# Patient Record
Sex: FEMALE | Race: WHITE | NOT HISPANIC OR LATINO | Employment: OTHER | URBAN - NONMETROPOLITAN AREA
[De-identification: names, ages, dates, MRNs, and addresses within clinical notes are randomized per-mention and may not be internally consistent; named-entity substitution may affect disease eponyms.]

---

## 2020-07-20 ENCOUNTER — APPOINTMENT (EMERGENCY)
Dept: RADIOLOGY | Facility: HOSPITAL | Age: 65
End: 2020-07-20
Payer: COMMERCIAL

## 2020-07-20 ENCOUNTER — HOSPITAL ENCOUNTER (EMERGENCY)
Facility: HOSPITAL | Age: 65
Discharge: HOME | End: 2020-07-20
Attending: EMERGENCY MEDICINE | Admitting: EMERGENCY MEDICINE
Payer: COMMERCIAL

## 2020-07-20 ENCOUNTER — APPOINTMENT (EMERGENCY)
Dept: CT IMAGING | Facility: HOSPITAL | Age: 65
End: 2020-07-20
Payer: COMMERCIAL

## 2020-07-20 VITALS
OXYGEN SATURATION: 98 % | HEART RATE: 56 BPM | TEMPERATURE: 97.8 F | RESPIRATION RATE: 11 BRPM | SYSTOLIC BLOOD PRESSURE: 130 MMHG | WEIGHT: 225.31 LBS | DIASTOLIC BLOOD PRESSURE: 62 MMHG

## 2020-07-20 DIAGNOSIS — R10.9 LEFT FLANK PAIN: Primary | ICD-10-CM

## 2020-07-20 DIAGNOSIS — K57.90 DIVERTICULOSIS: ICD-10-CM

## 2020-07-20 DIAGNOSIS — N39.0 UTI (URINARY TRACT INFECTION): ICD-10-CM

## 2020-07-20 LAB
ALBUMIN SERPL BCP-MCNC: 3.4 G/DL (ref 3.5–5)
ALP SERPL-CCNC: 66 U/L (ref 46–116)
ALT SERPL W P-5'-P-CCNC: 24 U/L (ref 12–78)
ANION GAP SERPL CALCULATED.3IONS-SCNC: 7 MMOL/L (ref 4–13)
AST SERPL W P-5'-P-CCNC: 14 U/L (ref 5–45)
ATRIAL RATE: 64 BPM
BACTERIA UR QL AUTO: ABNORMAL /HPF
BASOPHILS # BLD AUTO: 0.06 THOUSANDS/ΜL (ref 0–0.1)
BASOPHILS NFR BLD AUTO: 1 % (ref 0–1)
BILIRUB SERPL-MCNC: 0.26 MG/DL (ref 0.2–1)
BILIRUB UR QL STRIP: NEGATIVE
BUN SERPL-MCNC: 14 MG/DL (ref 5–25)
CALCIUM SERPL-MCNC: 9.2 MG/DL (ref 8.3–10.1)
CHLORIDE SERPL-SCNC: 105 MMOL/L (ref 100–108)
CK SERPL-CCNC: 87 U/L (ref 26–192)
CLARITY UR: CLEAR
CO2 SERPL-SCNC: 28 MMOL/L (ref 21–32)
COLOR UR: ABNORMAL
CREAT SERPL-MCNC: 0.74 MG/DL (ref 0.6–1.3)
EOSINOPHIL # BLD AUTO: 0.22 THOUSAND/ΜL (ref 0–0.61)
EOSINOPHIL NFR BLD AUTO: 2 % (ref 0–6)
ERYTHROCYTE [DISTWIDTH] IN BLOOD BY AUTOMATED COUNT: 13.8 % (ref 11.6–15.1)
GFR SERPL CREATININE-BSD FRML MDRD: 86 ML/MIN/1.73SQ M
GLUCOSE SERPL-MCNC: 111 MG/DL (ref 65–140)
GLUCOSE UR STRIP-MCNC: NEGATIVE MG/DL
HCT VFR BLD AUTO: 35.6 % (ref 34.8–46.1)
HGB BLD-MCNC: 11.3 G/DL (ref 11.5–15.4)
HGB UR QL STRIP.AUTO: ABNORMAL
IMM GRANULOCYTES # BLD AUTO: 0.02 THOUSAND/UL (ref 0–0.2)
IMM GRANULOCYTES NFR BLD AUTO: 0 % (ref 0–2)
INR PPP: 1.01 (ref 0.84–1.19)
KETONES UR STRIP-MCNC: NEGATIVE MG/DL
LACTATE SERPL-SCNC: 0.6 MMOL/L (ref 0.5–2)
LEUKOCYTE ESTERASE UR QL STRIP: ABNORMAL
LIPASE SERPL-CCNC: 104 U/L (ref 73–393)
LYMPHOCYTES # BLD AUTO: 3.3 THOUSANDS/ΜL (ref 0.6–4.47)
LYMPHOCYTES NFR BLD AUTO: 36 % (ref 14–44)
MAGNESIUM SERPL-MCNC: 2 MG/DL (ref 1.6–2.6)
MCH RBC QN AUTO: 26.7 PG (ref 26.8–34.3)
MCHC RBC AUTO-ENTMCNC: 31.7 G/DL (ref 31.4–37.4)
MCV RBC AUTO: 84 FL (ref 82–98)
MONOCYTES # BLD AUTO: 0.63 THOUSAND/ΜL (ref 0.17–1.22)
MONOCYTES NFR BLD AUTO: 7 % (ref 4–12)
NEUTROPHILS # BLD AUTO: 4.83 THOUSANDS/ΜL (ref 1.85–7.62)
NEUTS SEG NFR BLD AUTO: 54 % (ref 43–75)
NITRITE UR QL STRIP: POSITIVE
NON-SQ EPI CELLS URNS QL MICRO: ABNORMAL /HPF
NRBC BLD AUTO-RTO: 0 /100 WBCS
NT-PROBNP SERPL-MCNC: 115 PG/ML
P AXIS: 47 DEGREES
PH UR STRIP.AUTO: 6 [PH]
PLATELET # BLD AUTO: 255 THOUSANDS/UL (ref 149–390)
PMV BLD AUTO: 10 FL (ref 8.9–12.7)
POTASSIUM SERPL-SCNC: 3.8 MMOL/L (ref 3.5–5.3)
PR INTERVAL: 184 MS
PROT SERPL-MCNC: 7 G/DL (ref 6.4–8.2)
PROT UR STRIP-MCNC: ABNORMAL MG/DL
PROTHROMBIN TIME: 13.3 SECONDS (ref 11.6–14.5)
QRS AXIS: 27 DEGREES
QRSD INTERVAL: 104 MS
QT INTERVAL: 430 MS
QTC INTERVAL: 443 MS
RBC # BLD AUTO: 4.23 MILLION/UL (ref 3.81–5.12)
RBC #/AREA URNS AUTO: ABNORMAL /HPF
SODIUM SERPL-SCNC: 140 MMOL/L (ref 136–145)
SP GR UR STRIP.AUTO: 1.02 (ref 1–1.03)
T WAVE AXIS: 40 DEGREES
TROPONIN I SERPL-MCNC: <0.02 NG/ML
UROBILINOGEN UR QL STRIP.AUTO: 1 E.U./DL
VENTRICULAR RATE: 64 BPM
WBC # BLD AUTO: 9.06 THOUSAND/UL (ref 4.31–10.16)
WBC #/AREA URNS AUTO: ABNORMAL /HPF

## 2020-07-20 PROCEDURE — 96361 HYDRATE IV INFUSION ADD-ON: CPT

## 2020-07-20 PROCEDURE — 96366 THER/PROPH/DIAG IV INF ADDON: CPT

## 2020-07-20 PROCEDURE — 84484 ASSAY OF TROPONIN QUANT: CPT | Performed by: EMERGENCY MEDICINE

## 2020-07-20 PROCEDURE — 87086 URINE CULTURE/COLONY COUNT: CPT | Performed by: EMERGENCY MEDICINE

## 2020-07-20 PROCEDURE — 82550 ASSAY OF CK (CPK): CPT | Performed by: EMERGENCY MEDICINE

## 2020-07-20 PROCEDURE — 85025 COMPLETE CBC W/AUTO DIFF WBC: CPT | Performed by: EMERGENCY MEDICINE

## 2020-07-20 PROCEDURE — 81001 URINALYSIS AUTO W/SCOPE: CPT | Performed by: EMERGENCY MEDICINE

## 2020-07-20 PROCEDURE — 93005 ELECTROCARDIOGRAM TRACING: CPT

## 2020-07-20 PROCEDURE — 96365 THER/PROPH/DIAG IV INF INIT: CPT

## 2020-07-20 PROCEDURE — 74177 CT ABD & PELVIS W/CONTRAST: CPT

## 2020-07-20 PROCEDURE — 83605 ASSAY OF LACTIC ACID: CPT | Performed by: EMERGENCY MEDICINE

## 2020-07-20 PROCEDURE — 87186 SC STD MICRODIL/AGAR DIL: CPT | Performed by: EMERGENCY MEDICINE

## 2020-07-20 PROCEDURE — 36415 COLL VENOUS BLD VENIPUNCTURE: CPT | Performed by: EMERGENCY MEDICINE

## 2020-07-20 PROCEDURE — 83735 ASSAY OF MAGNESIUM: CPT | Performed by: EMERGENCY MEDICINE

## 2020-07-20 PROCEDURE — 83690 ASSAY OF LIPASE: CPT | Performed by: EMERGENCY MEDICINE

## 2020-07-20 PROCEDURE — 85610 PROTHROMBIN TIME: CPT | Performed by: EMERGENCY MEDICINE

## 2020-07-20 PROCEDURE — 99285 EMERGENCY DEPT VISIT HI MDM: CPT | Performed by: EMERGENCY MEDICINE

## 2020-07-20 PROCEDURE — 80053 COMPREHEN METABOLIC PANEL: CPT | Performed by: EMERGENCY MEDICINE

## 2020-07-20 PROCEDURE — 71275 CT ANGIOGRAPHY CHEST: CPT

## 2020-07-20 PROCEDURE — 99285 EMERGENCY DEPT VISIT HI MDM: CPT

## 2020-07-20 PROCEDURE — 83880 ASSAY OF NATRIURETIC PEPTIDE: CPT | Performed by: EMERGENCY MEDICINE

## 2020-07-20 PROCEDURE — 96375 TX/PRO/DX INJ NEW DRUG ADDON: CPT

## 2020-07-20 PROCEDURE — 87077 CULTURE AEROBIC IDENTIFY: CPT | Performed by: EMERGENCY MEDICINE

## 2020-07-20 PROCEDURE — 96376 TX/PRO/DX INJ SAME DRUG ADON: CPT

## 2020-07-20 PROCEDURE — 93010 ELECTROCARDIOGRAM REPORT: CPT | Performed by: INTERNAL MEDICINE

## 2020-07-20 RX ORDER — ACETAMINOPHEN 325 MG/1
650 TABLET ORAL EVERY 6 HOURS PRN
Qty: 30 TABLET | Refills: 0 | Status: SHIPPED | OUTPATIENT
Start: 2020-07-20

## 2020-07-20 RX ORDER — PHENAZOPYRIDINE HYDROCHLORIDE 200 MG/1
200 TABLET, FILM COATED ORAL 3 TIMES DAILY
Qty: 6 TABLET | Refills: 0 | Status: SHIPPED | OUTPATIENT
Start: 2020-07-20 | End: 2021-01-04 | Stop reason: ALTCHOICE

## 2020-07-20 RX ORDER — NAPROXEN 375 MG/1
375 TABLET ORAL 2 TIMES DAILY WITH MEALS
Qty: 20 TABLET | Refills: 0 | Status: SHIPPED | OUTPATIENT
Start: 2020-07-20 | End: 2020-07-20 | Stop reason: SINTOL

## 2020-07-20 RX ORDER — HYDRALAZINE HYDROCHLORIDE 20 MG/ML
10 INJECTION INTRAMUSCULAR; INTRAVENOUS ONCE
Status: DISCONTINUED | OUTPATIENT
Start: 2020-07-20 | End: 2020-07-20 | Stop reason: HOSPADM

## 2020-07-20 RX ORDER — CEFTRIAXONE 1 G/50ML
1000 INJECTION, SOLUTION INTRAVENOUS ONCE
Status: COMPLETED | OUTPATIENT
Start: 2020-07-20 | End: 2020-07-20

## 2020-07-20 RX ORDER — ONDANSETRON 2 MG/ML
4 INJECTION INTRAMUSCULAR; INTRAVENOUS ONCE
Status: COMPLETED | OUTPATIENT
Start: 2020-07-20 | End: 2020-07-20

## 2020-07-20 RX ORDER — METOCLOPRAMIDE HYDROCHLORIDE 5 MG/ML
10 INJECTION INTRAMUSCULAR; INTRAVENOUS ONCE
Status: COMPLETED | OUTPATIENT
Start: 2020-07-20 | End: 2020-07-20

## 2020-07-20 RX ORDER — CEPHALEXIN 500 MG/1
500 CAPSULE ORAL EVERY 12 HOURS SCHEDULED
Qty: 14 CAPSULE | Refills: 0 | Status: SHIPPED | OUTPATIENT
Start: 2020-07-20 | End: 2020-07-27

## 2020-07-20 RX ORDER — HYDROMORPHONE HCL/PF 1 MG/ML
1 SYRINGE (ML) INJECTION ONCE
Status: COMPLETED | OUTPATIENT
Start: 2020-07-20 | End: 2020-07-20

## 2020-07-20 RX ADMIN — IOHEXOL 100 ML: 350 INJECTION, SOLUTION INTRAVENOUS at 05:34

## 2020-07-20 RX ADMIN — ONDANSETRON 4 MG: 2 INJECTION INTRAMUSCULAR; INTRAVENOUS at 04:33

## 2020-07-20 RX ADMIN — SODIUM CHLORIDE 1000 ML: 0.9 INJECTION, SOLUTION INTRAVENOUS at 03:47

## 2020-07-20 RX ADMIN — METOCLOPRAMIDE HYDROCHLORIDE 10 MG: 5 INJECTION INTRAMUSCULAR; INTRAVENOUS at 05:47

## 2020-07-20 RX ADMIN — HYDROMORPHONE HYDROCHLORIDE 1 MG: 1 INJECTION, SOLUTION INTRAMUSCULAR; INTRAVENOUS; SUBCUTANEOUS at 03:48

## 2020-07-20 RX ADMIN — SODIUM CHLORIDE 1000 ML: 0.9 INJECTION, SOLUTION INTRAVENOUS at 06:13

## 2020-07-20 RX ADMIN — CEFTRIAXONE 1000 MG: 1 INJECTION, SOLUTION INTRAVENOUS at 06:13

## 2020-07-20 RX ADMIN — ONDANSETRON 4 MG: 2 INJECTION INTRAMUSCULAR; INTRAVENOUS at 03:48

## 2020-07-20 NOTE — ED NOTES
Pt awake but groggy, states still has no pain, will call for ride for discharge       Lauro Grant RN  07/20/20 2232

## 2020-07-20 NOTE — ED PROVIDER NOTES
History  Chief Complaint   Patient presents with    Flank Pain     left sided flank pain with suprapubic abdminal pressure and increased frequency and burning for "a few days"  55-year-old female with a past medical history of gastroparesis, hyperlipidemia, hypertension s/p two cardiac stents (March 2020, placed at [facility]) presents via EMS with dysuria and urinary frequency x3 days and left flank pain tonight with nausea  Patient states she purchased over-the-counter "Azo" for her urinary symptoms two days ago which had some improvement  Patient awoke from sleep 30 minutes prior to ED arrival with severe left flank pain that radiates across lower abdomen  Patient denies history of UTIs, pyelonephritis, nephrolithiasis, AAA or diverticulitis  Patient denies fever, chills, vomiting, cough, sputum production, chest pain, shortness of breath, rash, bloody stools, diarrhea or constipation  Patient denies fall, trauma or exertional activity  No medications taken prior to ED arrival   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 Pineda wore PPE during clinical evaluation due to COVID-19 pandemic including Bonnet, face mask, eye goggles and gloves        History provided by:  Patient and EMS personnel   used: No    Flank Pain   Pain location:  L flank  Pain quality: shooting and throbbing    Pain radiates to:  LLQ and suprapubic region  Pain severity:  Severe  Onset quality:  Gradual  Duration:  3 days  Timing:  Intermittent  Progression:  Worsening  Chronicity:  New  Context: awakening from sleep    Context: not alcohol use, not diet changes, not eating, not laxative use, not medication withdrawal, not previous surgeries, not recent illness, not recent sexual activity, not recent travel, not retching, not sick contacts, not suspicious food intake and not trauma    Relieved by:  Nothing  Worsened by:  Nothing  Ineffective treatments:  OTC medications  Associated symptoms: dysuria and nausea    Associated symptoms: no anorexia, no belching, no chest pain, no chills, no constipation, no cough, no diarrhea, no fatigue, no fever, no flatus, no hematemesis, no hematochezia, no hematuria, no melena, no shortness of breath, no sore throat, no vaginal bleeding, no vaginal discharge and no vomiting    Dysuria:     Severity:  Moderate    Onset quality:  Sudden    Duration:  3 days    Timing:  Intermittent    Progression:  Worsening    Chronicity:  New  Risk factors: being elderly    Risk factors: no alcohol abuse, no aspirin use, has not had multiple surgeries, no NSAID use, not obese and no recent hospitalization        None       History reviewed  No pertinent past medical history  History reviewed  No pertinent surgical history  History reviewed  No pertinent family history  I have reviewed and agree with the history as documented  E-Cigarette/Vaping    E-Cigarette Use Never User      E-Cigarette/Vaping Substances     Social History     Tobacco Use    Smoking status: Former Smoker    Smokeless tobacco: Never Used   Substance Use Topics    Alcohol use: Yes     Frequency: Monthly or less    Drug use: Not Currently       Review of Systems   Constitutional: Negative for chills, diaphoresis, fatigue and fever  HENT: Negative for congestion, drooling, facial swelling, nosebleeds, sneezing, sore throat, trouble swallowing and voice change  Eyes: Negative for photophobia, pain and visual disturbance  Respiratory: Negative for cough, chest tightness, shortness of breath and wheezing  Cardiovascular: Negative for chest pain, palpitations and leg swelling  Gastrointestinal: Positive for abdominal pain and nausea  Negative for anorexia, constipation, diarrhea, flatus, hematemesis, hematochezia, melena and vomiting  Genitourinary: Positive for dysuria, flank pain, frequency and urgency  Negative for decreased urine volume, difficulty urinating, hematuria, vaginal bleeding and vaginal discharge     Musculoskeletal: Negative for arthralgias, back pain, myalgias, neck pain and neck stiffness  Skin: Negative for color change, pallor, rash and wound  Allergic/Immunologic: Negative for immunocompromised state  Neurological: Negative for dizziness, tremors, seizures, syncope, facial asymmetry, speech difficulty, weakness, light-headedness, numbness and headaches  Hematological: Negative for adenopathy  Psychiatric/Behavioral: Negative for agitation, confusion, hallucinations and suicidal ideas  The patient is not nervous/anxious  Physical Exam  Physical Exam   Constitutional: She is oriented to person, place, and time  She appears well-developed and well-nourished  She is cooperative  Non-toxic appearance  She does not have a sickly appearance  She appears ill  No distress  HENT:   Head: Normocephalic and atraumatic  Right Ear: Hearing, tympanic membrane, external ear and ear canal normal    Left Ear: Hearing, tympanic membrane, external ear and ear canal normal    Nose: Nose normal  Right sinus exhibits no maxillary sinus tenderness and no frontal sinus tenderness  Left sinus exhibits no maxillary sinus tenderness and no frontal sinus tenderness  Mouth/Throat: Uvula is midline, oropharynx is clear and moist and mucous membranes are normal  No oropharyngeal exudate, posterior oropharyngeal edema, posterior oropharyngeal erythema or tonsillar abscesses  Eyes: Pupils are equal, round, and reactive to light  Conjunctivae, EOM and lids are normal    Neck: Trachea normal, normal range of motion, full passive range of motion without pain and phonation normal  Neck supple  No thyroid mass and no thyromegaly present  Cardiovascular: Normal rate, regular rhythm, S1 normal, S2 normal, normal heart sounds, intact distal pulses and normal pulses  Pulses:       Radial pulses are 2+ on the right side, and 2+ on the left side  Dorsalis pedis pulses are 2+ on the right side, and 2+ on the left side  Pulmonary/Chest: Effort normal and breath sounds normal  No accessory muscle usage or stridor  No tachypnea  No respiratory distress  She has no decreased breath sounds  She has no wheezes  She has no rhonchi  She has no rales  She exhibits no mass, no tenderness, no deformity and no retraction  Abdominal: Soft  Bowel sounds are normal  She exhibits no distension, no ascites and no mass  There is no hepatosplenomegaly  There is tenderness in the suprapubic area and left lower quadrant  There is CVA tenderness (left)  There is no rigidity, no rebound, no guarding, no tenderness at McBurney's point and negative Robledo's sign  No hernia  Musculoskeletal: Normal range of motion  She exhibits no edema, tenderness or deformity  Lymphadenopathy:     She has no cervical adenopathy  Neurological: She is alert and oriented to person, place, and time  She has normal strength  She is not disoriented  She displays no atrophy and no tremor  No cranial nerve deficit or sensory deficit  She exhibits normal muscle tone  She displays a negative Romberg sign  She displays no seizure activity  Coordination and gait normal  GCS eye subscore is 4  GCS verbal subscore is 5  GCS motor subscore is 6  Patient is AAOx4, GCS 15; speaking clearly and appropriately; motor and sensation intact; visual fields intact; cranial nerves II-XII grossly intact; no facial droop, slurred speech or arm drift   Skin: Skin is warm, dry and intact  Capillary refill takes less than 2 seconds  No abrasion, no bruising, no burn, no ecchymosis, no laceration, no lesion, no petechiae and no rash noted  Rash is not maculopapular  She is not diaphoretic  No erythema  No pallor  Psychiatric: She has a normal mood and affect  Her speech is normal and behavior is normal  Judgment and thought content normal  She is not actively hallucinating  Cognition and memory are normal  She is attentive  Nursing note and vitals reviewed        Vital Signs  ED Triage Vitals   Temperature Pulse Respirations Blood Pressure SpO2   07/20/20 0418 07/20/20 0349 07/20/20 0349 07/20/20 0349 07/20/20 0349   97 8 °F (36 6 °C) 69 18 (!) 172/68 97 %      Temp Source Heart Rate Source Patient Position - Orthostatic VS BP Location FiO2 (%)   07/20/20 0418 07/20/20 0349 07/20/20 0424 07/20/20 0424 --   Temporal Monitor Lying Left arm       Pain Score       07/20/20 0348       Worst Possible Pain           Vitals:    07/20/20 0349 07/20/20 0424   BP: (!) 172/68 130/62   Pulse: 69 57   Patient Position - Orthostatic VS:  Lying         Visual Acuity      ED Medications  Medications   hydrALAZINE (APRESOLINE) injection 10 mg (10 mg Intravenous Not Given 7/20/20 0426)   sodium chloride 0 9 % bolus 1,000 mL (1,000 mL Intravenous New Bag 7/20/20 0613)   ondansetron (ZOFRAN) injection 4 mg (4 mg Intravenous Given 7/20/20 0348)   HYDROmorphone (DILAUDID) injection 1 mg (1 mg Intravenous Given 7/20/20 0348)   sodium chloride 0 9 % bolus 1,000 mL (0 mL Intravenous Stopped 7/20/20 0436)   ondansetron (ZOFRAN) injection 4 mg (4 mg Intravenous Given 7/20/20 0433)   iohexol (OMNIPAQUE) 350 MG/ML injection (SINGLE-DOSE) 100 mL (100 mL Intravenous Given 7/20/20 0534)   metoclopramide (REGLAN) injection 10 mg (10 mg Intravenous Given 7/20/20 0547)   cefTRIAXone (ROCEPHIN) IVPB (premix) 1,000 mg 50 mL (1,000 mg Intravenous New Bag 7/20/20 0613)       Diagnostic Studies  Results Reviewed     Procedure Component Value Units Date/Time    Urine Microscopic [[id number]]  (Abnormal) Collected:  07/20/20 0536    Lab Status:  Final result Specimen:  Urine, Clean Catch Updated:  07/20/20 0612     RBC, UA 20-30 /hpf      WBC, UA 30-50 /hpf      Epithelial Cells Moderate /hpf      Bacteria, UA Occasional /hpf     Urine culture [[id number]] Collected:  07/20/20 0536    Lab Status:   In process Specimen:  Urine, Clean Catch Updated:  07/20/20 2346    UA w Reflex to Microscopic w Reflex to Culture [[id number]]  (Abnormal) Collected:  07/20/20 0536    Lab Status:  Final result Specimen:  Urine, Clean Catch Updated:  07/20/20 0602     Color, UA Laura     Clarity, UA Clear     Specific [place], UA 1 020     pH, UA 6 0     Leukocytes, UA Moderate     Nitrite, UA Positive     Protein,  (2+) mg/dl      Glucose, UA Negative mg/dl      Ketones, UA Negative mg/dl      Urobilinogen, UA 1 0 E U /dl      Bilirubin, UA Negative     Blood, UA Large    NT-BNP PRO [[id number]]  (Normal) Collected:  07/20/20 0346    Lab Status:  Final result Specimen:  Blood from Arm, Right Updated:  07/20/20 0501     NT-proBNP 115 pg/mL     Troponin I [[id number]]  (Normal) Collected:  07/20/20 0425    Lab Status:  Final result Specimen:  Blood from Arm, Right Updated:  07/20/20 0452     Troponin I <0 02 ng/mL     Lactic acid, plasma [[id number]]  (Normal) Collected:  07/20/20 0346    Lab Status:  Final result Specimen:  Blood from Arm, Right Updated:  07/20/20 0424     LACTIC ACID 0 6 mmol/L     Narrative:       Result may be elevated if tourniquet was used during collection      CMP [[id number]]  (Abnormal) Collected:  07/20/20 0346    Lab Status:  Final result Specimen:  Blood from Arm, Right Updated:  07/20/20 0419     Sodium 140 mmol/L      Potassium 3 8 mmol/L      Chloride 105 mmol/L      CO2 28 mmol/L      ANION GAP 7 mmol/L      BUN 14 mg/dL      Creatinine 0 74 mg/dL      Glucose 111 mg/dL      Calcium 9 2 mg/dL      AST 14 U/L      ALT 24 U/L      Alkaline Phosphatase 66 U/L      Total Protein 7 0 g/dL      Albumin 3 4 g/dL      Total Bilirubin 0 26 mg/dL      eGFR 86 ml/min/1 73sq m     Narrative:       Nika guidelines for Chronic Kidney Disease (CKD):     Stage 1 with normal or high GFR (GFR > 90 mL/min/1 73 square meters)    Stage 2 Mild CKD (GFR = 60-89 mL/min/1 73 square meters)    Stage 3A Moderate CKD (GFR = 45-59 mL/min/1 73 square meters)    Stage 3B Moderate CKD (GFR = 30-44 mL/min/1 73 square meters)    Stage 4 Severe CKD (GFR = 15-29 mL/min/1 73 square meters)    Stage 5 End Stage CKD (GFR <15 mL/min/1 73 square meters)  Note: GFR calculation is accurate only with a steady state creatinine    Lipase [[id number]]  (Normal) Collected:  07/20/20 0346    Lab Status:  Final result Specimen:  Blood from Arm, Right Updated:  07/20/20 0419     Lipase 104 u/L     Magnesium [[id number]]  (Normal) Collected:  07/20/20 0346    Lab Status:  Final result Specimen:  Blood from Arm, Right Updated:  07/20/20 0419     Magnesium 2 0 mg/dL     CK (with reflex to MB) [[id number]]  (Normal) Collected:  07/20/20 0346    Lab Status:  Final result Specimen:  Blood from Arm, Right Updated:  07/20/20 0419     Total CK 87 U/L     Protime-INR [[id number]]  (Normal) Collected:  07/20/20 0346    Lab Status:  Final result Specimen:  Blood from Arm, Right Updated:  07/20/20 0410     Protime 13 3 seconds      INR 1 01    CBC and differential [[id number]]  (Abnormal) Collected:  07/20/20 0346    Lab Status:  Final result Specimen:  Blood from Arm, Right Updated:  07/20/20 0355     WBC 9 06 Thousand/uL      RBC 4 23 Million/uL      Hemoglobin 11 3 g/dL      Hematocrit 35 6 %      MCV 84 fL      MCH 26 7 pg      MCHC 31 7 g/dL      RDW 13 8 %      MPV 10 0 fL      Platelets 952 Thousands/uL      nRBC 0 /100 WBCs      Neutrophils Relative 54 %      Immat GRANS % 0 %      Lymphocytes Relative 36 %      Monocytes Relative 7 %      Eosinophils Relative 2 %      Basophils Relative 1 %      Neutrophils Absolute 4 83 Thousands/µL      Immature Grans Absolute 0 02 Thousand/uL      Lymphocytes Absolute 3 30 Thousands/µL      Monocytes Absolute 0 63 Thousand/µL      Eosinophils Absolute 0 22 Thousand/µL      Basophils Absolute 0 06 Thousands/µL                  CT pe study w abdomen pelvis w contrast   Final Result by Yash Mcintyre MD (07/20 2273)      No evidence for pulmonary embolism  Bibasilar dependent atelectasis  No evidence for obstructive uropathy  Questionable mild pelvic urothelial wall enhancement bilaterally should be correlated urinalysis for the presence of infection  Colonic diverticulosis without evidence for acute diverticulitis  Workstation performed: [id number]                    Procedures  ECG 12 Lead Documentation Only  Date/Time: 7/20/2020 4:36 AM  Performed by: Joycelyn Parker MD  Authorized by: Joycelyn Parker MD     Patient location:  ED  Interpretation:     Interpretation: normal    Rate:     ECG rate:  64bpm    ECG rate assessment: normal    Rhythm:     Rhythm: sinus rhythm    Ectopy:     Ectopy: none    QRS:     QRS axis:  Left  Conduction:     Conduction: normal    ST segments:     ST segments:  Normal  T waves:     T waves: flattening and inverted      Flattening:  V3    Inverted:  AVR and V1  Comments:      No STEMI  AZ 184ms  QRS 104ms  QT 443ms             ED Course  ED Course as of Jul 20 0651   Mon Jul 20, 2020   0359 Patient states she has taken Dilaudid before without adverse effect       0406 Reassessed patient  Her left flank pain is now 2/10 after Dilaudid and nausea improved after Zofran  0423 Patient's pulse ox 78% with good waveform while patient is supine  She did just get Dilaudid 1mg IV for pain  Woke up patient and elevated bed, asked patient to take in deep breaths  Pulse ox went up to 96% slowly, patient denies chest pain or shortness of breath  Will check CXR, EKG and troponin  6236 Asked CT tech to change imaging to CT PE and CTAP w/contrast       0536 Reassessed patient  She is more awake now  Vitals stable  Pulse ox 96% on 2L NC  She is still nauseous  Will try Reglan  0543 Attempted calling Radiology at [phone], ext  3 multiple times with no response  1258 CT PE/CTAP:IMPRESSION:     No evidence for pulmonary embolism  Bibasilar dependent atelectasis      No evidence for obstructive uropathy    Questionable mild pelvic urothelial wall enhancement bilaterally should be correlated urinalysis for the presence of infection      Colonic diverticulosis without evidence for acute diverticulitis       9307 Reassessed patient  She has no complaints at this time  She is more awake  Reviewed labs and imaging with patient and requested nurse to reassess her over the next 30-45 minutes as she returns to baseline  Requested patient to be ambulated on room air to ensure that her vitals are normal   Patient will need to call family to pick her up  Patient states she cannot take naproxen due to her recent cardiac catheterization and stent placement  Will discontinue naproxen and right script for Tylenol  Strict return to ER precautions discussed with and acknowledged by patient  Work note provided  Will sign her out to Dr Bradley Doing  US AUDIT      Most Recent Value   Initial Alcohol Screen: US AUDIT-C    1  How often do you have a drink containing alcohol?  0 Filed at: 07/20/2020 0349   2  How many drinks containing alcohol do you have on a typical day you are drinking? 0 Filed at: 07/20/2020 0349   3b  FEMALE Any Age, or MALE 65+: How often do you have 4 or more drinks on one occassion?   0 Filed at: 07/20/2020 0349   Audit-C Score  0 Filed at: 07/20/2020 9050      MDM  Number of Diagnoses or Management Options     Amount and/or Complexity of Data Reviewed  Clinical lab tests: reviewed and ordered  Tests in the radiology section of CPT®: reviewed and ordered  Tests in the medicine section of CPT®: ordered and reviewed  Review and summarize past medical records: yes  Discuss the patient with other providers: yes  Independent visualization of images, tracings, or specimens: yes (CTAP)    Risk of Complications, Morbidity, and/or Mortality  Presenting problems: moderate  Diagnostic procedures: moderate  Management options: moderate    Patient Progress  Patient progress: stable        Disposition  Final diagnoses:   Left flank pain   UTI (urinary tract infection)   Diverticulosis Time reflects when diagnosis was documented in both MDM as applicable and the Disposition within this note     Time User Action Codes Description Comment    7/20/2020  3:49 AM Price [place] Add [R10 9] Left flank pain     7/20/2020  6:14 AM Price [place] Add [N39 0] UTI (urinary tract infection)     7/20/2020  6:28 AM Price [place] Add [K57 90] Diverticulosis       ED Disposition     ED Disposition Condition Date/Time Comment    Discharge Stable Mon Jul 20, 2020  6:45 AM Kiya Cortes discharge to home/self care  Follow-up Information     Follow up With Specialties Details Why Contact Info        Please find a primary care doctor by reviewing the "Family Practices in [facility]" list           Patient's Medications   Discharge Prescriptions    ACETAMINOPHEN (TYLENOL) 325 MG TABLET    Take 2 tablets (650 mg total) by mouth every 6 (six) hours as needed for moderate pain       Start Date: 7/20/2020 End Date: --       Order Dose: 650 mg       Quantity: 30 tablet    Refills: 0    CEPHALEXIN (KEFLEX) 500 MG CAPSULE    Take 1 capsule (500 mg total) by mouth every 12 (twelve) hours for 7 days       Start Date: 7/20/2020 End Date: 7/27/2020       Order Dose: 500 mg       Quantity: 14 capsule    Refills: 0    PHENAZOPYRIDINE (PYRIDIUM) 200 MG TABLET    Take 1 tablet (200 mg total) by mouth 3 (three) times a day       Start Date: 7/20/2020 End Date: --       Order Dose: 200 mg       Quantity: 6 tablet    Refills: 0     No discharge procedures on file      PDMP Review       Value Time User    PDMP Reviewed  Yes 7/20/2020  5:34 AM Jose Fuentes MD          ED Provider  Electronically Signed by      MD Jose Toscano MD  07/20/20 9741

## 2020-07-20 NOTE — ED NOTES
Patient placed on 2L O2 via nasal canula for decreased O2 saturation        Jarret Garcia RN  07/20/20 6996

## 2020-07-22 LAB — BACTERIA UR CULT: ABNORMAL

## 2021-01-04 ENCOUNTER — APPOINTMENT (EMERGENCY)
Dept: CT IMAGING | Facility: HOSPITAL | Age: 66
End: 2021-01-04
Payer: COMMERCIAL

## 2021-01-04 ENCOUNTER — HOSPITAL ENCOUNTER (EMERGENCY)
Facility: HOSPITAL | Age: 66
Discharge: HOME | End: 2021-01-04
Attending: EMERGENCY MEDICINE | Admitting: EMERGENCY MEDICINE
Payer: COMMERCIAL

## 2021-01-04 VITALS
TEMPERATURE: 98.1 F | SYSTOLIC BLOOD PRESSURE: 119 MMHG | RESPIRATION RATE: 15 BRPM | OXYGEN SATURATION: 99 % | HEART RATE: 62 BPM | WEIGHT: 213.41 LBS | DIASTOLIC BLOOD PRESSURE: 51 MMHG

## 2021-01-04 DIAGNOSIS — K52.9 COLITIS: Primary | ICD-10-CM

## 2021-01-04 LAB
ABO GROUP BLD: NORMAL
ALBUMIN SERPL BCP-MCNC: 3.5 G/DL (ref 3.5–5)
ALP SERPL-CCNC: 78 U/L (ref 46–116)
ALT SERPL W P-5'-P-CCNC: 26 U/L (ref 12–78)
ANION GAP SERPL CALCULATED.3IONS-SCNC: 8 MMOL/L (ref 4–13)
AST SERPL W P-5'-P-CCNC: 14 U/L (ref 5–45)
BASOPHILS # BLD AUTO: 0.05 THOUSANDS/ΜL (ref 0–0.1)
BASOPHILS NFR BLD AUTO: 1 % (ref 0–1)
BILIRUB SERPL-MCNC: 0.4 MG/DL (ref 0.2–1)
BLD GP AB SCN SERPL QL: NEGATIVE
BUN SERPL-MCNC: 18 MG/DL (ref 5–25)
CALCIUM SERPL-MCNC: 9.2 MG/DL (ref 8.3–10.1)
CHLORIDE SERPL-SCNC: 103 MMOL/L (ref 100–108)
CO2 SERPL-SCNC: 28 MMOL/L (ref 21–32)
CREAT SERPL-MCNC: 0.82 MG/DL (ref 0.6–1.3)
EOSINOPHIL # BLD AUTO: 0.1 THOUSAND/ΜL (ref 0–0.61)
EOSINOPHIL NFR BLD AUTO: 1 % (ref 0–6)
ERYTHROCYTE [DISTWIDTH] IN BLOOD BY AUTOMATED COUNT: 13.2 % (ref 11.6–15.1)
FLUAV RNA RESP QL NAA+PROBE: NEGATIVE
FLUBV RNA RESP QL NAA+PROBE: NEGATIVE
GFR SERPL CREATININE-BSD FRML MDRD: 76 ML/MIN/1.73SQ M
GLUCOSE SERPL-MCNC: 126 MG/DL (ref 65–140)
HCT VFR BLD AUTO: 39.2 % (ref 34.8–46.1)
HGB BLD-MCNC: 12.6 G/DL (ref 11.5–15.4)
IMM GRANULOCYTES # BLD AUTO: 0.04 THOUSAND/UL (ref 0–0.2)
IMM GRANULOCYTES NFR BLD AUTO: 0 % (ref 0–2)
LYMPHOCYTES # BLD AUTO: 1.54 THOUSANDS/ΜL (ref 0.6–4.47)
LYMPHOCYTES NFR BLD AUTO: 17 % (ref 14–44)
MCH RBC QN AUTO: 27.3 PG (ref 26.8–34.3)
MCHC RBC AUTO-ENTMCNC: 32.1 G/DL (ref 31.4–37.4)
MCV RBC AUTO: 85 FL (ref 82–98)
MONOCYTES # BLD AUTO: 0.48 THOUSAND/ΜL (ref 0.17–1.22)
MONOCYTES NFR BLD AUTO: 5 % (ref 4–12)
NEUTROPHILS # BLD AUTO: 6.91 THOUSANDS/ΜL (ref 1.85–7.62)
NEUTS SEG NFR BLD AUTO: 76 % (ref 43–75)
NRBC BLD AUTO-RTO: 0 /100 WBCS
PLATELET # BLD AUTO: 280 THOUSANDS/UL (ref 149–390)
PMV BLD AUTO: 9.6 FL (ref 8.9–12.7)
POTASSIUM SERPL-SCNC: 4.4 MMOL/L (ref 3.5–5.3)
PROT SERPL-MCNC: 7.7 G/DL (ref 6.4–8.2)
RBC # BLD AUTO: 4.61 MILLION/UL (ref 3.81–5.12)
RH BLD: POSITIVE
RSV RNA RESP QL NAA+PROBE: NEGATIVE
SARS-COV-2 RNA RESP QL NAA+PROBE: NEGATIVE
SODIUM SERPL-SCNC: 139 MMOL/L (ref 136–145)
SPECIMEN EXPIRATION DATE: NORMAL
WBC # BLD AUTO: 9.12 THOUSAND/UL (ref 4.31–10.16)

## 2021-01-04 PROCEDURE — 36415 COLL VENOUS BLD VENIPUNCTURE: CPT | Performed by: EMERGENCY MEDICINE

## 2021-01-04 PROCEDURE — 86900 BLOOD TYPING SEROLOGIC ABO: CPT | Performed by: EMERGENCY MEDICINE

## 2021-01-04 PROCEDURE — 85025 COMPLETE CBC W/AUTO DIFF WBC: CPT | Performed by: EMERGENCY MEDICINE

## 2021-01-04 PROCEDURE — 82272 OCCULT BLD FECES 1-3 TESTS: CPT

## 2021-01-04 PROCEDURE — 80053 COMPREHEN METABOLIC PANEL: CPT | Performed by: EMERGENCY MEDICINE

## 2021-01-04 PROCEDURE — 86850 RBC ANTIBODY SCREEN: CPT | Performed by: EMERGENCY MEDICINE

## 2021-01-04 PROCEDURE — 0241U HB NFCT DS VIR RESP RNA 4 TRGT: CPT | Performed by: EMERGENCY MEDICINE

## 2021-01-04 PROCEDURE — G1004 CDSM NDSC: HCPCS

## 2021-01-04 PROCEDURE — 99284 EMERGENCY DEPT VISIT MOD MDM: CPT | Performed by: EMERGENCY MEDICINE

## 2021-01-04 PROCEDURE — 86901 BLOOD TYPING SEROLOGIC RH(D): CPT | Performed by: EMERGENCY MEDICINE

## 2021-01-04 PROCEDURE — 74177 CT ABD & PELVIS W/CONTRAST: CPT

## 2021-01-04 PROCEDURE — 99285 EMERGENCY DEPT VISIT HI MDM: CPT

## 2021-01-04 RX ORDER — AMOXICILLIN AND CLAVULANATE POTASSIUM 875; 125 MG/1; MG/1
1 TABLET, FILM COATED ORAL EVERY 12 HOURS
Qty: 14 TABLET | Refills: 0 | Status: SHIPPED | OUTPATIENT
Start: 2021-01-04 | End: 2021-01-11

## 2021-01-04 RX ORDER — MULTIVIT-MIN/IRON FUM/FOLIC AC 7.5 MG-4
1 TABLET ORAL DAILY
COMMUNITY

## 2021-01-04 RX ORDER — PRASUGREL 10 MG/1
10 TABLET, FILM COATED ORAL DAILY
COMMUNITY
Start: 2020-09-08

## 2021-01-04 RX ORDER — METOPROLOL TARTRATE 100 MG/1
100 TABLET ORAL 2 TIMES DAILY
COMMUNITY
Start: 2020-10-13

## 2021-01-04 RX ORDER — LISINOPRIL 40 MG/1
40 TABLET ORAL DAILY
COMMUNITY

## 2021-01-04 RX ORDER — PREDNISONE 20 MG/1
40 TABLET ORAL DAILY
Qty: 6 TABLET | Refills: 0 | Status: SHIPPED | OUTPATIENT
Start: 2021-01-04 | End: 2021-01-07

## 2021-01-04 RX ORDER — OMEPRAZOLE 20 MG/1
20 CAPSULE, DELAYED RELEASE ORAL DAILY
COMMUNITY

## 2021-01-04 RX ADMIN — IOHEXOL 100 ML: 350 INJECTION, SOLUTION INTRAVENOUS at 10:04

## 2021-01-04 NOTE — ED PROVIDER NOTES
History  Chief Complaint   Patient presents with    Rectal Bleeding     pt c/o blood in stools, nausea, and vomiting since 1800 yesterday during dinner  denies travel/sob/fevers/cough     Patient complains of bloody diarrhea since yesterday  She states that at approximately 6:00 p m  yesterday she began to have some lower abdominal discomfort and diarrhea with blood in it  She denies fevers but does admit to some nausea/vomiting  She denies rectal pain  She denies rashes  She denies syncope or shortness of breath  No other complaints  Patient is on Effient  History provided by:  Patient   used: No    Rectal Bleeding - Minor  Quality:  Mucoid and bright red  Amount: Moderate  Duration:  12 hours  Timing:  Constant  Chronicity:  New  Relieved by:  Nothing  Worsened by:  Nothing  Associated symptoms: abdominal pain and vomiting    Associated symptoms: no dizziness, no epistaxis, no fever, no hematemesis, no light-headedness and no loss of consciousness    Risk factors: anticoagulant use        Prior to Admission Medications   Prescriptions Last Dose Informant Patient Reported? Taking?    ASPIRIN 81 PO   Yes Yes   Sig: Take 81 mg by mouth daily   COENZYME Q10 PO  Self Yes Yes   Sig: Take by mouth daily   CRANBERRY EXTRACT PO  Self Yes Yes   Sig: Take by mouth   KRILL OIL PO  Self Yes Yes   Sig: Take by mouth   Multiple Vitamins-Minerals (multivitamin with minerals) tablet  Self Yes Yes   Sig: Take 1 tablet by mouth daily   acetaminophen (TYLENOL) 325 mg tablet   No Yes   Sig: Take 2 tablets (650 mg total) by mouth every 6 (six) hours as needed for moderate pain   lisinopril (ZESTRIL) 40 mg tablet  Self Yes Yes   Sig: Take 40 mg by mouth daily   metoprolol tartrate (LOPRESSOR) 100 mg tablet   Yes Yes   Sig: Take 100 mg by mouth 2 (two) times a day   omeprazole (PriLOSEC) 20 mg delayed release capsule  Self Yes Yes   Sig: Take 20 mg by mouth daily   prasugrel (EFFIENT) tablet   Yes Yes Sig: Take 10 mg by mouth daily      Facility-Administered Medications: None       History reviewed  No pertinent past medical history  History reviewed  No pertinent surgical history  History reviewed  No pertinent family history  I have reviewed and agree with the history as documented  E-Cigarette/Vaping    E-Cigarette Use Never User      E-Cigarette/Vaping Substances     Social History     Tobacco Use    Smoking status: Former Smoker    Smokeless tobacco: Never Used   Substance Use Topics    Alcohol use: Yes     Frequency: Monthly or less    Drug use: Not Currently       Review of Systems   Constitutional: Negative for chills and fever  HENT: Negative for ear pain, hearing loss, nosebleeds, sore throat, trouble swallowing and voice change  Eyes: Negative for pain and discharge  Respiratory: Negative for cough, shortness of breath and wheezing  Cardiovascular: Negative for chest pain and palpitations  Gastrointestinal: Positive for abdominal pain, blood in stool, diarrhea, hematochezia and vomiting  Negative for constipation, hematemesis and nausea  Genitourinary: Negative for dysuria, flank pain, frequency and hematuria  Musculoskeletal: Negative for joint swelling, neck pain and neck stiffness  Skin: Negative for rash and wound  Neurological: Negative for dizziness, seizures, loss of consciousness, syncope, facial asymmetry, light-headedness and headaches  Psychiatric/Behavioral: Negative for hallucinations, self-injury and suicidal ideas  All other systems reviewed and are negative  Physical Exam  Physical Exam  Vitals signs and nursing note reviewed  Exam conducted with a chaperone present (CORY Ledezma)  Constitutional:       General: She is not in acute distress  Appearance: She is well-developed  HENT:      Head: Normocephalic and atraumatic        Right Ear: External ear normal       Left Ear: External ear normal    Eyes:      Conjunctiva/sclera: Conjunctivae normal       Pupils: Pupils are equal, round, and reactive to light  Neck:      Musculoskeletal: Normal range of motion and neck supple  Cardiovascular:      Rate and Rhythm: Normal rate and regular rhythm  Heart sounds: Normal heart sounds  No murmur  Pulmonary:      Effort: Pulmonary effort is normal       Breath sounds: Normal breath sounds  No wheezing or rales  Abdominal:      General: Bowel sounds are normal  There is no distension  Palpations: Abdomen is soft  Tenderness: There is no abdominal tenderness  There is no guarding or rebound  Genitourinary:     Rectum: Normal  Guaiac result positive  Musculoskeletal: Normal range of motion  General: No deformity  Skin:     General: Skin is warm and dry  Findings: No rash  Neurological:      General: No focal deficit present  Mental Status: She is alert and oriented to person, place, and time  Cranial Nerves: No cranial nerve deficit     Psychiatric:         Behavior: Behavior normal          Vital Signs  ED Triage Vitals [01/04/21 0847]   Temperature Pulse Respirations Blood Pressure SpO2   98 1 °F (36 7 °C) 80 18 164/81 95 %      Temp Source Heart Rate Source Patient Position - Orthostatic VS BP Location FiO2 (%)   Temporal Monitor Lying Right arm --      Pain Score       --           Vitals:    01/04/21 0847 01/04/21 0930 01/04/21 0945 01/04/21 1015   BP: 164/81 125/57 113/53 147/65   Pulse: 80 63 60 66   Patient Position - Orthostatic VS: Lying            Visual Acuity      ED Medications  Medications   iohexol (OMNIPAQUE) 350 MG/ML injection (SINGLE-DOSE) 100 mL (100 mL Intravenous Given 1/4/21 1004)       Diagnostic Studies  Results Reviewed     Procedure Component Value Units Date/Time    COVID19, Influenza A/B, RSV PCR, [facility] [[id number]]  (Normal) Collected: 01/04/21 0913    Lab Status: Final result Specimen: Nares from Nasopharyngeal Swab Updated: 01/04/21 1012     SARS-CoV-2 Negative INFLUENZA A PCR Negative     INFLUENZA B PCR Negative     RSV PCR Negative    Narrative: This test has been authorized by FDA under an EUA (Emergency Use Assay) for use by authorized laboratories  Clinical caution and judgement should be used with the interpretation of these results with consideration of the clinical impression and other laboratory testing  Testing reported as "Positive" or "Negative" has been proven to be accurate according to standard laboratory validation requirements  All testing is performed with control materials showing appropriate reactivity at standard intervals      Comprehensive metabolic panel [[id number]] Collected: 01/04/21 0913    Lab Status: Final result Specimen: Blood from Arm, Right Updated: 01/04/21 0951     Sodium 139 mmol/L      Potassium 4 4 mmol/L      Chloride 103 mmol/L      CO2 28 mmol/L      ANION GAP 8 mmol/L      BUN 18 mg/dL      Creatinine 0 82 mg/dL      Glucose 126 mg/dL      Calcium 9 2 mg/dL      AST 14 U/L      ALT 26 U/L      Alkaline Phosphatase 78 U/L      Total Protein 7 7 g/dL      Albumin 3 5 g/dL      Total Bilirubin 0 40 mg/dL      eGFR 76 ml/min/1 73sq m     Narrative:      [place] guidelines for Chronic Kidney Disease (CKD):     Stage 1 with normal or high GFR (GFR > 90 mL/min/1 73 square meters)    Stage 2 Mild CKD (GFR = 60-89 mL/min/1 73 square meters)    Stage 3A Moderate CKD (GFR = 45-59 mL/min/1 73 square meters)    Stage 3B Moderate CKD (GFR = 30-44 mL/min/1 73 square meters)    Stage 4 Severe CKD (GFR = 15-29 mL/min/1 73 square meters)    Stage 5 End Stage CKD (GFR <15 mL/min/1 73 square meters)  Note: GFR calculation is accurate only with a steady state creatinine    CBC and differential [[id number]]  (Abnormal) Collected: 01/04/21 0913    Lab Status: Final result Specimen: Blood from Arm, Right Updated: 01/04/21 0935     WBC 9 12 Thousand/uL      RBC 4 61 Million/uL      Hemoglobin 12 6 g/dL Hematocrit 39 2 %      MCV 85 fL      MCH 27 3 pg      MCHC 32 1 g/dL      RDW 13 2 %      MPV 9 6 fL      Platelets 091 Thousands/uL      nRBC 0 /100 WBCs      Neutrophils Relative 76 %      Immat GRANS % 0 %      Lymphocytes Relative 17 %      Monocytes Relative 5 %      Eosinophils Relative 1 %      Basophils Relative 1 %      Neutrophils Absolute 6 91 Thousands/µL      Immature Grans Absolute 0 04 Thousand/uL      Lymphocytes Absolute 1 54 Thousands/µL      Monocytes Absolute 0 48 Thousand/µL      Eosinophils Absolute 0 10 Thousand/µL      Basophils Absolute 0 05 Thousands/µL                  CT abdomen pelvis w contrast   Final Result by Umer Ferrari MD (01/04 1017)      Extensive colitis, likely infectious or inflammatory  Workstation performed: [id number]                    Procedures  Procedures         ED Course  ED Course as of Jan 04 1027   Mon Jan 04, 2021   1023 No hypotension or tachycardia  Hemoglobin stable  CT abdomen pelvis reveals colitis  Appropriate for outpatient treatment with oral antibiotic and oral steroid  Patient states she gets significant GI upset from quinolones so I will prescribe high-dose Augmentin  Patient agreeable with plan to follow up with GI on an outpatient basis  Referral information provided to patient  SBIRT 20yo+      Most Recent Value   SBIRT (24 yo +)   In order to provide better care to our patients, we are screening all of our patients for alcohol and drug use  Would it be okay to ask you these screening questions? Yes Filed at: 01/04/2021 8693   Initial Alcohol Screen: US AUDIT-C    1  How often do you have a drink containing alcohol? 1 Filed at: 01/04/2021 0919   2  How many drinks containing alcohol do you have on a typical day you are drinking? 0 Filed at: 01/04/2021 0919   3b  FEMALE Any Age, or MALE 65+: How often do you have 4 or more drinks on one occassion?   0 Filed at: 01/04/2021 0919   Audit-C Score  1 Filed at: 01/04/2021 0919   LARRY: How many times in the past year have you    Used an illegal drug or used a prescription medication for non-medical reasons? Never Filed at: 01/04/2021 0170                    [facility]  Number of Diagnoses or Management Options     Amount and/or Complexity of Data Reviewed  Clinical lab tests: ordered and reviewed  Tests in the radiology section of CPT®: ordered and reviewed  Decide to obtain previous medical records or to obtain history from someone other than the patient: yes  Review and summarize past medical records: yes  Independent visualization of images, tracings, or specimens: yes        Disposition  Final diagnoses:   Colitis     Time reflects when diagnosis was documented in both MDM as applicable and the Disposition within this note     Time User Action Codes Description Comment    1/4/2021 10:25 AM Avel Nicholas [K52 9] Colitis       ED Disposition     ED Disposition Condition Date/Time Comment    Discharge Stable Mon Jan 4, 2021 10:25 AM Daja Goodwin discharge to home/self care  Follow-up Information     Follow up With Specialties Details Why [address] [place] [address] Gastroenterology  As needed [address] [place], [place] [id number]      Your primary care physician   As needed           Patient's Medications   Discharge Prescriptions    AMOXICILLIN-CLAVULANATE (AUGMENTIN) 875-125 MG PER TABLET    Take 1 tablet by mouth every 12 (twelve) hours for 7 days       Start Date: 1/4/2021  End Date: 1/11/2021       Order Dose: 1 tablet       Quantity: 14 tablet    Refills: 0    PREDNISONE 20 MG TABLET    Take 2 tablets (40 mg total) by mouth daily for 3 days       Start Date: 1/4/2021  End Date: 1/7/2021       Order Dose: 40 mg       Quantity: 6 tablet    Refills: 0     No discharge procedures on file      PDMP Review       Value Time User    PDMP Reviewed  Yes 7/20/2020  5:34 AM Hazel Palma MD          ED Provider  Electronically Signed by           Florencio Fox MD  01/04/21 6359

## 2021-09-21 ENCOUNTER — HOSPITAL ENCOUNTER (EMERGENCY)
Facility: HOSPITAL | Age: 66
Discharge: HOME | End: 2021-09-21
Attending: EMERGENCY MEDICINE | Admitting: EMERGENCY MEDICINE
Payer: MEDICARE

## 2021-09-21 ENCOUNTER — APPOINTMENT (EMERGENCY)
Dept: CT IMAGING | Facility: HOSPITAL | Age: 66
End: 2021-09-21
Payer: MEDICARE

## 2021-09-21 VITALS
OXYGEN SATURATION: 97 % | RESPIRATION RATE: 16 BRPM | SYSTOLIC BLOOD PRESSURE: 149 MMHG | TEMPERATURE: 97.6 F | DIASTOLIC BLOOD PRESSURE: 67 MMHG | HEART RATE: 64 BPM | WEIGHT: 215 LBS | HEIGHT: 64 IN | BODY MASS INDEX: 36.7 KG/M2

## 2021-09-21 DIAGNOSIS — S09.90XA INJURY OF HEAD, INITIAL ENCOUNTER: Primary | ICD-10-CM

## 2021-09-21 PROCEDURE — 99284 EMERGENCY DEPT VISIT MOD MDM: CPT | Performed by: EMERGENCY MEDICINE

## 2021-09-21 PROCEDURE — 99284 EMERGENCY DEPT VISIT MOD MDM: CPT

## 2021-09-21 PROCEDURE — 70450 CT HEAD/BRAIN W/O DYE: CPT

## 2021-09-21 RX ORDER — ONDANSETRON 4 MG/1
4 TABLET, ORALLY DISINTEGRATING ORAL ONCE
Status: COMPLETED | OUTPATIENT
Start: 2021-09-21 | End: 2021-09-21

## 2021-09-21 RX ORDER — ACETAMINOPHEN 325 MG/1
650 TABLET ORAL ONCE
Status: COMPLETED | OUTPATIENT
Start: 2021-09-21 | End: 2021-09-21

## 2021-09-21 RX ADMIN — ONDANSETRON 4 MG: 4 TABLET, ORALLY DISINTEGRATING ORAL at 16:34

## 2021-09-21 RX ADMIN — ACETAMINOPHEN 650 MG: 325 TABLET ORAL at 16:34

## 2022-01-31 ENCOUNTER — HOSPITAL ENCOUNTER (EMERGENCY)
Facility: HOSPITAL | Age: 67
Discharge: HOME | End: 2022-01-31
Attending: EMERGENCY MEDICINE | Admitting: EMERGENCY MEDICINE
Payer: MEDICARE

## 2022-01-31 VITALS
TEMPERATURE: 97 F | OXYGEN SATURATION: 99 % | RESPIRATION RATE: 20 BRPM | HEART RATE: 60 BPM | SYSTOLIC BLOOD PRESSURE: 136 MMHG | DIASTOLIC BLOOD PRESSURE: 76 MMHG

## 2022-01-31 DIAGNOSIS — K64.8 INTERNAL HEMORRHOID: Primary | ICD-10-CM

## 2022-01-31 PROCEDURE — 99282 EMERGENCY DEPT VISIT SF MDM: CPT | Performed by: EMERGENCY MEDICINE

## 2022-01-31 PROCEDURE — 99283 EMERGENCY DEPT VISIT LOW MDM: CPT

## 2022-01-31 NOTE — ED PROVIDER NOTES
History  Chief Complaint   Patient presents with    Hemorrhoids     presents due to PCP wanting eval for potential thrombosis for hemorrhoids, pt reports hx of requiring surgery      Patient is a 61-year-old female sent to the emergency department on telephone advice from PCP office secondary to possible thrombosed hemorrhoid, patient has a history of thrombosed hemorrhoids approximately 20 years ago requiring surgery, over the past few days she has noted a nonpainful firm lump in her rectal area that has prolapsed on occasion, she reports nonpainful soft bowel movements, no bleeding, no fevers, no difficulty with bowel movements, no urinary symptoms, she takes a daily baby aspirin but no blood thinners, denies any abdominal pain, no nausea or vomiting          Prior to Admission Medications   Prescriptions Last Dose Informant Patient Reported? Taking? COENZYME Q10 PO  Self Yes No   Sig: Take by mouth daily   CRANBERRY EXTRACT PO  Self Yes No   Sig: Take by mouth   KRILL OIL PO  Self Yes No   Sig: Take by mouth   Multiple Vitamins-Minerals (multivitamin with minerals) tablet  Self Yes No   Sig: Take 1 tablet by mouth daily   acetaminophen (TYLENOL) 325 mg tablet   No No   Sig: Take 2 tablets (650 mg total) by mouth every 6 (six) hours as needed for moderate pain   lisinopril (ZESTRIL) 40 mg tablet  Self Yes No   Sig: Take 40 mg by mouth daily   metoprolol tartrate (LOPRESSOR) 100 mg tablet   Yes No   Sig: Take 100 mg by mouth 2 (two) times a day   omeprazole (PriLOSEC) 20 mg delayed release capsule  Self Yes No   Sig: Take 20 mg by mouth daily   prasugrel (EFFIENT) tablet   Yes No   Sig: Take 10 mg by mouth daily      Facility-Administered Medications: None       Past Medical History:   Diagnosis Date    Hypertension     Polymyositis ([address] )        Past Surgical History:   Procedure Laterality Date    CORONARY ANGIOPLASTY WITH STENT PLACEMENT         History reviewed  No pertinent family history    I have reviewed and agree with the history as documented  E-Cigarette/Vaping    E-Cigarette Use Never User      E-Cigarette/Vaping Substances     Social History     Tobacco Use    Smoking status: Former Smoker    Smokeless tobacco: Never Used   Vaping Use    Vaping Use: Never used   Substance Use Topics    Alcohol use: Yes    Drug use: Not Currently       Review of Systems   Constitutional: Negative  HENT: Negative  Eyes: Negative  Respiratory: Negative  Cardiovascular: Negative  Gastrointestinal:        Hemorrhoids   Endocrine: Negative  Genitourinary: Negative  Musculoskeletal: Negative  Skin: Negative  Allergic/Immunologic: Negative  Neurological: Negative  Hematological: Negative  Psychiatric/Behavioral: Negative  Physical Exam  Physical Exam  Constitutional:       Appearance: She is well-developed  HENT:      Head: Normocephalic and atraumatic  Eyes:      Conjunctiva/sclera: Conjunctivae normal       Pupils: Pupils are equal, round, and reactive to light  Cardiovascular:      Rate and Rhythm: Normal rate  Pulmonary:      Effort: Pulmonary effort is normal    Abdominal:      Palpations: Abdomen is soft  Genitourinary:     Rectum: Internal hemorrhoid present  Comments: 1 cm firm a non- tender, non bleeding internal hemorrhoid at the 12 o'clock position, no gross blood  Musculoskeletal:         General: Normal range of motion  Cervical back: Normal range of motion and neck supple  Skin:     General: Skin is warm and dry  Neurological:      Mental Status: She is alert and oriented to person, place, and time           Vital Signs  ED Triage Vitals [01/31/22 1158]   Temperature Pulse Respirations Blood Pressure SpO2   (!) 97 °F (36 1 °C) 60 20 139/78 99 %      Temp src Heart Rate Source Patient Position - Orthostatic VS BP Location FiO2 (%)   -- Monitor Sitting Right arm --      Pain Score       2           Vitals:    01/31/22 1158   BP: 139/78 Pulse: 60   Patient Position - Orthostatic VS: Sitting               ED Medications  Medications - No data to display    Diagnostic Studies  Results Reviewed     None                 No orders to display                   ED Course  ED Course as of 01/31/22 1228   Mon Jan 31, 2022   1227 Patient with nonpainful, nonthrombosed, nonbleeding internal hemorrhoid, she reports soft daily bowel movements, no abdominal pain, no fever, she does have a remote history of thrombosed hemorrhoids requiring surgery, she was sent by PCP for evaluation after a telephone call to the office regarding her symptoms, patient's vital signs are stable, exam consistent with non concerning internal hemorrhoid, referral placed for follow-up with Colorectal, advised to follow-up with PCP as well, return if symptoms worsen or any additional concerns, patient acknowledges understanding and agreement with this plan                               SBIRT 20yo+      Most Recent Value   SBIRT (23 yo +)    In order to provide better care to our patients, we are screening all of our patients for alcohol and drug use  Would it be okay to ask you these screening questions? Yes Filed at: 01/31/2022 1215   Initial Alcohol Screen: US AUDIT-C     1  How often do you have a drink containing alcohol? 1 Filed at: 01/31/2022 1215   2  How many drinks containing alcohol do you have on a typical day you are drinking? 0 Filed at: 01/31/2022 1215   3b  FEMALE Any Age, or MALE 65+: How often do you have 4 or more drinks on one occassion? 0 Filed at: 01/31/2022 1215   Audit-C Score 1 Filed at: 01/31/2022 1215   LARRY: How many times in the past year have you    Used an illegal drug or used a prescription medication for non-medical reasons?  Never Filed at: 01/31/2022 1215                      Disposition  Final diagnoses:   Internal hemorrhoid     Time reflects when diagnosis was documented in both MDM as applicable and the Disposition within this note     Time User Action Codes Description Comment    1/31/2022 12:23 PM Jeremiah Michaels Yu [K64 8] Internal hemorrhoid       ED Disposition     ED Disposition Condition Date/Time Comment    Discharge Stable Mon Jan 31, 2022 12:23 PM Patric Tapia discharge to home/self care              Follow-up Information     Follow up With Specialties Details Why Contact Info    Haleigh Bowden MD Colon and Rectal Surgery In 3 days  [address] [facility]  [phone]            Patient's Medications   Discharge Prescriptions    No medications on file           PDMP Review       Value Time User    PDMP Reviewed  Yes 7/20/2020  5:34 AM Tyler Waters MD          ED Provider  Electronically Signed by           Jay Lott DO  01/31/22 1222

## 2022-01-31 NOTE — ED NOTES
Rectal exam by Dr Yousif Dawson witnessed by Carlo Nielson student  Pt ileana Ruiz, CORY  01/31/22 3565